# Patient Record
Sex: MALE | Race: WHITE | Employment: FULL TIME | URBAN - METROPOLITAN AREA
[De-identification: names, ages, dates, MRNs, and addresses within clinical notes are randomized per-mention and may not be internally consistent; named-entity substitution may affect disease eponyms.]

---

## 2018-08-23 ENCOUNTER — APPOINTMENT (OUTPATIENT)
Dept: GENERAL RADIOLOGY | Facility: CLINIC | Age: 28
End: 2018-08-23
Attending: PHYSICIAN ASSISTANT
Payer: COMMERCIAL

## 2018-08-23 ENCOUNTER — HOSPITAL ENCOUNTER (EMERGENCY)
Facility: CLINIC | Age: 28
Discharge: HOME | End: 2018-08-23
Attending: PHYSICIAN ASSISTANT | Admitting: PHYSICIAN ASSISTANT
Payer: COMMERCIAL

## 2018-08-23 VITALS
OXYGEN SATURATION: 97 % | DIASTOLIC BLOOD PRESSURE: 83 MMHG | TEMPERATURE: 98.1 F | RESPIRATION RATE: 16 BRPM | SYSTOLIC BLOOD PRESSURE: 127 MMHG

## 2018-08-23 DIAGNOSIS — S91.331A PUNCTURE WOUND OF RIGHT FOOT, INITIAL ENCOUNTER: ICD-10-CM

## 2018-08-23 PROCEDURE — G0463 HOSPITAL OUTPT CLINIC VISIT: HCPCS

## 2018-08-23 PROCEDURE — 99213 OFFICE O/P EST LOW 20 MIN: CPT | Performed by: PHYSICIAN ASSISTANT

## 2018-08-23 PROCEDURE — 73630 X-RAY EXAM OF FOOT: CPT | Mod: RT

## 2018-08-23 RX ORDER — CIPROFLOXACIN 500 MG/1
500 TABLET, FILM COATED ORAL 2 TIMES DAILY
Qty: 10 TABLET | Refills: 0 | Status: SHIPPED | OUTPATIENT
Start: 2018-08-23 | End: 2018-08-28

## 2018-08-23 NOTE — ED AVS SNAPSHOT
[facility]    [address] [facility] [address]    Phone:  [phone]    Fax:  [phone]                                       Sravan Mcintosh   MRN: [id number]    Department:  [facility]   Date of Visit:  8/23/2018           After Visit Summary Signature Page     I have received my discharge instructions, and my questions have been answered. I have discussed any challenges I see with this plan with the nurse or doctor.    ..........................................................................................................................................  Patient/Patient Representative Signature      ..........................................................................................................................................  Patient Representative Print Name and Relationship to Patient    ..................................................               ................................................  Date                                            Time    ..........................................................................................................................................  Reviewed by Signature/Title    ...................................................              ..............................................  Date                                                            Time          22EPIC Rev 08/18

## 2018-08-23 NOTE — ED AVS SNAPSHOT
[facility]    [address] [facility] [address]    Phone:  [phone]    Fax:  [phone]                                       Sravan Mcintosh   MRN: [id number]    Department:  [facility]   Date of Visit:  8/23/2018           Patient Information     Date Of Birth          1990        Your diagnoses for this visit were:     Puncture wound of right foot, initial encounter        You were seen by Cherise Tejada PA-C.      Follow-up Information     Please follow up.    Why:  As needed, If symptoms worsen        Discharge Instructions         Puncture Wound: Foot       A puncture wound occurs when a pointed object (such as a nail) pushes into the skin. It may go into the tissues below the skin of the foot, including fat and muscle. This type of wound is narrow and deep. They can be hard to clean. Puncture wounds are at high risk for becoming infected. One type of serious infection is more likely if you were wearing a rubber-soled shoe at the time of injury. Bacteria from the sole of the shoe may be dragged into the wound. Symptoms of infection may appear as late as 2 to 3 weeks after the injury. Be sure to watch for symptoms of infection and call your healthcare provider right away if any them appear.  X-rays may be done to see whether any objects remain under the skin. Your may also need a tetanus shot. This is given if you are not up to date on this vaccination and the object that caused the wound may lead to tetanus.  Puncture wounds can easily become infected.   Home care    When you sit or lie down, raise the foot above the level of your heart. This helps reduce swelling and pain.    Do not put weight on the injured foot if it hurts to do so or if you were told to keep weight off the injury.    Your healthcare provider may prescribe an antibiotic. This is to help prevent infection. Follow all instructions for taking this medicine. Take the  medicine every day until it is gone or you are told to stop. You should not have any left over.    The healthcare provider may prescribe medicines for pain. Follow instructions for taking them.    You can take acetaminophen or ibuprofen for pain, unless you were given a different pain medicine to use.     Follow the healthcare provider s instructions on how to care for the wound.    Keep the wound clean and dry. Do not get the wound wet until you are told it is okay to do so. If the area gets wet, gently pat it dry with a clean cloth. Replace the wet bandage with a dry one.    If a bandage was applied and it becomes wet or dirty, replace it. Otherwise, leave it in place for the first 24 hours.    Once you can get the wound wet, you may shower as usual but do not soak the wound in water (no tub baths or swimming)    Check the wound daily for symptoms of infection. These include:  ? Increasing redness or swelling around the wound  ? Increased warmth of the wound  ? Worsening pain  ? Red streaking lines away from the wound  ? Draining pus  Follow-up care  Follow up with your healthcare provider as advised.   When to seek medical advice  Call your healthcare provider right away if any of these occur:    Any symptoms of infection (listed above)    Fever of 100.4 F (38. C) or higher, or as directed by your healthcare provider    Wound changes colors    Numbness around the wound    Decreased movement around the injured area  Date Last Reviewed: 8/24/2015 2000-2017 [employer]. [address] [place] [address]. All rights reserved. This information is not intended as a substitute for professional medical care. Always follow your healthcare professional's instructions.          24 Hour Appointment Hotline       To make an appointment at any [facility], call [phone] ([phone]). If you don't have a family doctor or clinic, we will help you find one. [facility] are conveniently  located to serve the needs of you and your family.             Review of your medicines      START taking        Dose / Directions Last dose taken    ciprofloxacin 500 MG tablet   Commonly known as:  CIPRO   Dose:  500 mg   Quantity:  10 tablet        Take 1 tablet (500 mg) by mouth 2 times daily for 5 days   Refills:  0          Our records show that you are taking the medicines listed below. If these are incorrect, please call your family doctor or clinic.        Dose / Directions Last dose taken    ibuprofen 800 MG tablet   Commonly known as:  ADVIL/MOTRIN   Dose:  800 mg   Quantity:  90 tablet        Take 1 tablet by mouth every 8 hours as needed for pain.   Refills:  1        omeprazole 20 MG tablet   Dose:  20 mg   Quantity:  30 tablet        Take 1 tablet by mouth daily. Take 30-60 minutes before a meal.   Refills:  0                Prescriptions were sent or printed at these locations (1 Prescription)                   [facility] [place], [place] - [address]   [address] [facility] [facility] [address]    Telephone:  [phone]   Fax:  [phone]   Hours:                  E-Prescribed (1 of 1)         ciprofloxacin (CIPRO) 500 MG tablet                Procedures and tests performed during your visit     Foot  XR, G/E 3 views, right      Orders Needing Specimen Collection     None      Pending Results     No orders found from 8/21/2018 to 8/24/2018.            Pending Culture Results     No orders found from 8/21/2018 to 8/24/2018.            Pending Results Instructions     If you had any lab results that were not finalized at the time of your Discharge, you can call the ED Lab Result RN at [phone]. You will be contacted by this team for any positive Lab results or changes in treatment. The nurses are available 7 days a week from 10A to 6:30P.  You can leave a message 24 hours per day and they will return your call.        Test Results From Your Hospital Stay        8/23/2018  2:58 PM  "     Narrative     XR FOOT RT G/E 3 VW  2018 2:38 PM    HISTORY:  Puncture wound from nail. Pain is overlying third distal  metatarsal.    COMPARISON:  2006.        Impression     IMPRESSION:  Negative. No evidence for fracture or radiopaque foreign  body.    KIARA MIRAMONTES MD                Thank you for choosing [facility]       Thank you for choosing [facility] for your care. Our goal is always to provide you with excellent care. Hearing back from our patients is one way we can continue to improve our services. Please take a few minutes to complete the written survey that you may receive in the mail after you visit with us. Thank you!        [employer] Information     [email or web address] lets you send messages to your doctor, view your test results, renew your prescriptions, schedule appointments and more. To sign up, go to [email or web address] . Click on \"Log in\" on the left side of the screen, which will take you to the Welcome page. Then click on \"Sign up Now\" on the right side of the page.     You will be asked to enter the access code listed below, as well as some personal information. Please follow the directions to create your username and password.     Your access code is: [email or web address]  Expires: 2018  2:59 PM     Your access code will  in 90 days. If you need help or a new code, please call your [facility] clinic or [phone].        Care EveryWhere ID     This is your Care EveryWhere ID. This could be used by other organizations to access your [facility] medical records  [id number]        Equal Access to Services     [facility] CATALINA : Hadii aad ku hadasho Soomaali, waaxda luqadaha, qaybta kaalmada adeegyada, galileo ramos . So [facility] [phone].    ATENCIÓN: Si habla español, tiene a pittman disposición servicios gratuitos de asistencia lingüística. Llame al [phone].    We comply with applicable federal civil rights laws and [place] laws. We do not discriminate on the " basis of race, color, national origin, age, disability, sex, sexual orientation, or gender identity.            After Visit Summary       This is your record. Keep this with you and show to your community pharmacist(s) and doctor(s) at your next visit.

## 2018-08-23 NOTE — DISCHARGE INSTRUCTIONS
Puncture Wound: Foot       A puncture wound occurs when a pointed object (such as a nail) pushes into the skin. It may go into the tissues below the skin of the foot, including fat and muscle. This type of wound is narrow and deep. They can be hard to clean. Puncture wounds are at high risk for becoming infected. One type of serious infection is more likely if you were wearing a rubber-soled shoe at the time of injury. Bacteria from the sole of the shoe may be dragged into the wound. Symptoms of infection may appear as late as 2 to 3 weeks after the injury. Be sure to watch for symptoms of infection and call your healthcare provider right away if any them appear.  X-rays may be done to see whether any objects remain under the skin. Your may also need a tetanus shot. This is given if you are not up to date on this vaccination and the object that caused the wound may lead to tetanus.  Puncture wounds can easily become infected.   Home care    When you sit or lie down, raise the foot above the level of your heart. This helps reduce swelling and pain.    Do not put weight on the injured foot if it hurts to do so or if you were told to keep weight off the injury.    Your healthcare provider may prescribe an antibiotic. This is to help prevent infection. Follow all instructions for taking this medicine. Take the medicine every day until it is gone or you are told to stop. You should not have any left over.    The healthcare provider may prescribe medicines for pain. Follow instructions for taking them.    You can take acetaminophen or ibuprofen for pain, unless you were given a different pain medicine to use.     Follow the healthcare provider s instructions on how to care for the wound.    Keep the wound clean and dry. Do not get the wound wet until you are told it is okay to do so. If the area gets wet, gently pat it dry with a clean cloth. Replace the wet bandage with a dry one.    If a bandage was applied and it  becomes wet or dirty, replace it. Otherwise, leave it in place for the first 24 hours.    Once you can get the wound wet, you may shower as usual but do not soak the wound in water (no tub baths or swimming)    Check the wound daily for symptoms of infection. These include:  ? Increasing redness or swelling around the wound  ? Increased warmth of the wound  ? Worsening pain  ? Red streaking lines away from the wound  ? Draining pus  Follow-up care  Follow up with your healthcare provider as advised.   When to seek medical advice  Call your healthcare provider right away if any of these occur:    Any symptoms of infection (listed above)    Fever of 100.4 F (38. C) or higher, or as directed by your healthcare provider    Wound changes colors    Numbness around the wound    Decreased movement around the injured area  Date Last Reviewed: 8/24/2015 2000-2017 [employer]. [address] [place] [address]. All rights reserved. This information is not intended as a substitute for professional medical care. Always follow your healthcare professional's instructions.

## 2018-08-23 NOTE — ED TRIAGE NOTES
Patient here for pain in the R foot - stepped on nail.  Last tetanus - 2015.  Patient presents ambulatory to the urgent care.

## 2018-08-23 NOTE — ED PROVIDER NOTES
History     Chief Complaint   Patient presents with     Foot Pain     Had tram nail into bottom of R foot about 2 hours ago.  Took Ibuprofen.       HPI  Sravan Mcintosh is a 27 year old male who sent to the urgent care with concern of her right foot injury which occurred approximately 4 hours ago.  Patient will stepped on a nail which went through his rubber soled shoe and punctured the plantar surface of his right foot.  Since then he can have some mild to moderate pain.  He cleaned with soap and water , warm soak, and applied band aid pressure dressing.  He is unaware of any swelling in the foot.  No known worsening erythema.  He has not had any discharge or drainage from the wound.  He has not had any numbness or paresthesias.  His tetanus vaccine is up to date per his report.      Problem List:    Patient Active Problem List    Diagnosis Date Noted     GERD (gastroesophageal reflux disease) 03/18/2013     Priority: Medium     CARDIOVASCULAR SCREENING; LDL GOAL LESS THAN 160 10/31/2010     Priority: Medium     PCL TEAR, INCOMPLETE 01/30/2007     Priority: Medium     January 30, 2007 - patellar subluxation vs post/ant cruciate injury.  February 7, 2007 - ortho.  2/19/07 [facility]. MRI-PCL tear of left knee. Start PT. If not able to pass PT in 3 months, further eval on surgery and collect stress x-rays at that time.       Backache 01/15/2007     Priority: Medium     January 15, 2007 - straightening of lordosis otherwise xray negative.  Offered PT but decline and opted for watchful waiting.  Problem list name updated by automated process. Provider to review       Lyme disease 06/29/2006     Priority: Medium     June 29, 2006 - EM present.  Doxy 100 BID x 28 days.  Ibuprofen and recheck if worse/severe.        Past Medical History:    Past Medical History:   Diagnosis Date     Other acne      Past Surgical History:    Past Surgical History:   Procedure Laterality Date     NO HISTORY OF SURGERY       Family  History:    Family History   Problem Relation Age of Onset     Diabetes Maternal Grandfather      Hypertension No family hx of      Cerebrovascular Disease No family hx of      Breast Cancer No family hx of      Cancer - colorectal No family hx of      Prostate Cancer No family hx of      C.A.D. Maternal Grandfather      Blood Disease Father      lymphoma     Social History:  Marital Status:  Single [1]  Social History   Substance Use Topics     Smoking status: Never Smoker     Smokeless tobacco: Never Used     Alcohol use No      Medications:      ciprofloxacin (CIPRO) 500 MG tablet   ibuprofen (ADVIL,MOTRIN) 800 MG tablet   omeprazole 20 MG tablet     Review of Systems  INTEGUMENTARY/SKIN: POSITIVE for puncture wound right foot NEGATIVE for laceration, erythema or worrisome rashes   MUSCULOSKELETAL: POSITIVE  for right foot pain and NEGATIVE for other concerning new onset arthralgias or myalgias   NEURO: NEGATIVE for numbness paresthesias   Physical Exam   BP: 127/83  Heart Rate: 91  Temp: 98.1  F (36.7  C)  Resp: 16  SpO2: 97 %  Physical Exam   Constitutional: He is oriented to person, place, and time. He appears well-developed and well-nourished. No distress.   Musculoskeletal:        Right ankle: Normal.        Right foot: There is tenderness. There is normal range of motion, no bony tenderness, no swelling, no crepitus and no deformity.        Feet:    Neurological: He is alert and oriented to person, place, and time. No sensory deficit.   Skin: Skin is warm and dry. No ecchymosis and no rash noted. No erythema.     ED Course     ED Course     Procedures        Critical Care time:  none        Results for orders placed or performed during the hospital encounter of 08/23/18   Foot  XR, G/E 3 views, right    Narrative    XR FOOT RT G/E 3 VW  8/23/2018 2:38 PM    HISTORY:  Puncture wound from nail. Pain is overlying third distal  metatarsal.    COMPARISON:  5/2/2006.      Impression    IMPRESSION:  Negative. No  evidence for fracture or radiopaque foreign  body.    KIARA MIRAMONTES MD     Medications - No data to display    Assessments & Plan (with Medical Decision Making)     I have reviewed the nursing notes.  I have reviewed the findings, diagnosis, plan and need for follow up with the patient.       New Prescriptions    CIPROFLOXACIN (CIPRO) 500 MG TABLET    Take 1 tablet (500 mg) by mouth 2 times daily for 5 days     Final diagnoses:   Puncture wound of right foot, initial encounter     27-year-old male presents to urgent care with concern of a puncture wound to his right foot which occurred earlier this afternoon.  He had stable vital signs upon arrival.  Physical exam findings as described above.  As part of evaluation he did have x-ray of his right foot which is negative for acute fracture, retained foreign body, air pocket.  Given increased risk of infection through a rubber soled shoe patient was initiated on Cipro 500 mg twice daily for 5 days.  He was instructed to use symptomatic treatment with warm compresses.  Follow-up as needed if new or worsening symptoms develop.  Worrisome reasons to return to ER/UC discussed.      Disclaimer: This note consists of symbols derived from keyboarding, dictation, and/or voice recognition software. As a result, there may be errors in the script that have gone undetected.  Please consider this when interpreting information found in the chart.    8/23/2018   [facility] EMERGENCY DEPARTMENT     Cherise Tejada PA-C  08/24/18 4416

## 2018-11-25 ENCOUNTER — HOSPITAL ENCOUNTER (EMERGENCY)
Facility: CLINIC | Age: 28
Discharge: HOME | End: 2018-11-25
Attending: EMERGENCY MEDICINE | Admitting: EMERGENCY MEDICINE
Payer: OTHER MISCELLANEOUS

## 2018-11-25 VITALS
HEART RATE: 100 BPM | DIASTOLIC BLOOD PRESSURE: 93 MMHG | SYSTOLIC BLOOD PRESSURE: 143 MMHG | TEMPERATURE: 98.5 F | RESPIRATION RATE: 18 BRPM

## 2018-11-25 DIAGNOSIS — S00.83XA CONTUSION OF FACE, INITIAL ENCOUNTER: ICD-10-CM

## 2018-11-25 PROCEDURE — 99282 EMERGENCY DEPT VISIT SF MDM: CPT | Mod: Z6 | Performed by: EMERGENCY MEDICINE

## 2018-11-25 PROCEDURE — 99282 EMERGENCY DEPT VISIT SF MDM: CPT | Performed by: EMERGENCY MEDICINE

## 2018-11-25 ASSESSMENT — VISUAL ACUITY
OD: 20/15
OS: 20/15

## 2018-11-25 NOTE — DISCHARGE INSTRUCTIONS
"  Facial Contusion  A contusion is another word for a bruise. It happens when small blood vessels break open and leak blood into the nearby area. A facial contusion can result from a bump, hit, or fall. This may happen during sports or an accident. Symptoms of a contusion often include changes in skin color (bruising), swelling, and pain.   The swelling from the contusion should decrease in a few days. Bruising and pain may take several weeks to go away.   Home care    If you have been prescribed medicines for pain, take them as directed.    To help reduce swelling and pain, wrap a cold pack or bag of frozen peas in a thin towel. Put it on the injured area for up to 20 minutes. Do this a few times a day until the swelling goes down.     If you have scrapes or cuts on your face requiring stiches or other closures, care for them as directed.    For the next 24 hours (or longer if instructed):  ? Don t drink alcohol, or use sedatives or medicines that make you sleepy.  ? Don t drive or operate machinery.  ? Don't do anything strenuous. Don t lift or strain.  ? Don't return to sports or other activity that could result in another head injury.  Note about concussions  Because the injury was to your head, it is possible that a concussion (mild brain injury) could result. Symptoms of a concussion can show up later. Be alert for signs and symptoms of a concussion. Seek emergency medical care if any of these develop over the next hours to days:    Headache    Nausea or vomiting    Dizziness    Sensitivity to light or noise    Unusual sleepiness or grogginess    Trouble falling asleep    Personality changes    Vision changes    Memory loss    Confusion    Trouble walking or clumsiness    Loss of consciousness (even for a short time)    Inability to be awakened    Feeling \"off\" or slow as if in a daze   Follow-up care  Follow up with your healthcare provider, or as directed.  When to seek medical advice  Call your healthcare " provider right away if any of these occur:    Swelling or pain that gets worse, not better    New swelling or pain    Warmth or drainage from the swollen area or from cuts or scrapes    Fluid drainage or bleeding from the nose or ears    Fever of 100.4 F (38 C) or higher, or as directed by your healthcare provider  Call 911  Call 911 if any of the following occur:     Repeated vomiting    Unusual drowsiness or trouble awakening    Fainting or loss of consciousness    Seizure    Worsening confusion, memory loss, dizziness, headache, behavior, speech, or vision  Date Last Reviewed: 5/1/2017 2000-2018 [employer]. [address] [place] [address]. All rights reserved. This information is not intended as a substitute for professional medical care. Always follow your healthcare professional's instructions.
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
## 2018-11-25 NOTE — ED AVS SNAPSHOT
[facility] Emergency Department    [address] [address]    Phone:  [phone]    Fax:  [phone]                                       Sravan Mcintosh   MRN: [id number]    Department:  [facility] Emergency Department   Date of Visit:  11/25/2018           Patient Information     Date Of Birth          1990        Your diagnoses for this visit were:     Contusion of face, initial encounter        You were seen by Rosalino Avila MD.      Follow-up Information     Schedule an appointment as soon as possible for a visit with [facility].    Why:  As needed    Contact information:    [address] [address] [phone]          Discharge Instructions         Facial Contusion  A contusion is another word for a bruise. It happens when small blood vessels break open and leak blood into the nearby area. A facial contusion can result from a bump, hit, or fall. This may happen during sports or an accident. Symptoms of a contusion often include changes in skin color (bruising), swelling, and pain.   The swelling from the contusion should decrease in a few days. Bruising and pain may take several weeks to go away.   Home care    If you have been prescribed medicines for pain, take them as directed.    To help reduce swelling and pain, wrap a cold pack or bag of frozen peas in a thin towel. Put it on the injured area for up to 20 minutes. Do this a few times a day until the swelling goes down.     If you have scrapes or cuts on your face requiring stiches or other closures, care for them as directed.    For the next 24 hours (or longer if instructed):  ? Don t drink alcohol, or use sedatives or medicines that make you sleepy.  ? Don t drive or operate machinery.  ? Don't do anything strenuous. Don t lift or strain.  ? Don't return to sports or other activity that could result in another head injury.  Note about concussions  Because the injury was  "to your head, it is possible that a concussion (mild brain injury) could result. Symptoms of a concussion can show up later. Be alert for signs and symptoms of a concussion. Seek emergency medical care if any of these develop over the next hours to days:    Headache    Nausea or vomiting    Dizziness    Sensitivity to light or noise    Unusual sleepiness or grogginess    Trouble falling asleep    Personality changes    Vision changes    Memory loss    Confusion    Trouble walking or clumsiness    Loss of consciousness (even for a short time)    Inability to be awakened    Feeling \"off\" or slow as if in a daze   Follow-up care  Follow up with your healthcare provider, or as directed.  When to seek medical advice  Call your healthcare provider right away if any of these occur:    Swelling or pain that gets worse, not better    New swelling or pain    Warmth or drainage from the swollen area or from cuts or scrapes    Fluid drainage or bleeding from the nose or ears    Fever of 100.4 F (38 C) or higher, or as directed by your healthcare provider  Call 911  Call 911 if any of the following occur:     Repeated vomiting    Unusual drowsiness or trouble awakening    Fainting or loss of consciousness    Seizure    Worsening confusion, memory loss, dizziness, headache, behavior, speech, or vision  Date Last Reviewed: 5/1/2017 2000-2018 [employer]. [address] [place] [address], [place] [address]. All rights reserved. This information is not intended as a substitute for professional medical care. Always follow your healthcare professional's instructions.          24 Hour Appointment Hotline       To make an appointment at any [facility], call [phone] ([phone]). If you don't have a family doctor or clinic, we will help you find one. [facility] clinics are conveniently located to serve the needs of you and your family.             Review of your medicines      Our records show that you are taking the " "medicines listed below. If these are incorrect, please call your family doctor or clinic.        Dose / Directions Last dose taken    ibuprofen 800 MG tablet   Commonly known as:  ADVIL/MOTRIN   Dose:  800 mg   Quantity:  90 tablet        Take 1 tablet by mouth every 8 hours as needed for pain.   Refills:  1        omeprazole 20 MG tablet   Dose:  20 mg   Quantity:  30 tablet        Take 1 tablet by mouth daily. Take 30-60 minutes before a meal.   Refills:  0                Orders Needing Specimen Collection     None      Pending Results     No orders found from 11/23/2018 to 11/26/2018.            Pending Culture Results     No orders found from 11/23/2018 to 11/26/2018.            Pending Results Instructions     If you had any lab results that were not finalized at the time of your Discharge, you can call the ED Lab Result RN at [phone]. You will be contacted by this team for any positive Lab results or changes in treatment. The nurses are available 7 days a week from 10A to 6:30P.  You can leave a message 24 hours per day and they will return your call.        Test Results From Your Hospital Stay               Thank you for choosing [facility]       Thank you for choosing [facility] for your care. Our goal is always to provide you with excellent care. Hearing back from our patients is one way we can continue to improve our services. Please take a few minutes to complete the written survey that you may receive in the mail after you visit with us. Thank you!        [employer] Information     [employer] lets you send messages to your doctor, view your test results, renew your prescriptions, schedule appointments and more. To sign up, go to [email or web address] . Click on \"Log in\" on the left side of the screen, which will take you to the Welcome page. Then click on \"Sign up Now\" on the right side of the page.     You will be asked to enter the access code listed below, as well as some personal information. Please " follow the directions to create your username and password.     Your access code is: [email or web address]  Expires: 2019  1:43 AM     Your access code will  in 90 days. If you need help or a new code, please call your [facility] clinic or [phone].        Care EveryWhere ID     This is your Care EveryWhere ID. This could be used by other organizations to access your [facility] medical records  [id number]        Equal Access to Services     SANTO ARNOLD : Hadii lino kulkarni hadasho Soomaali, waaxda luqadaha, qaybta kaalmada adeegyada, galileo ramos . So [facility] [phone].    ATENCIÓN: Si habla español, tiene a pittman disposición servicios gratuitos de asistencia lingüística. Llame al [phone].    We comply with applicable federal civil rights laws and [place] laws. We do not discriminate on the basis of race, color, national origin, age, disability, sex, sexual orientation, or gender identity.            After Visit Summary       This is your record. Keep this with you and show to your community pharmacist(s) and doctor(s) at your next visit.

## 2018-11-25 NOTE — ED AVS SNAPSHOT
[facility] Emergency Department    [address] [address]    Phone:  [phone]    Fax:  [phone]                                       Sravan Mcintosh   MRN: [id number]    Department:  [facility] Emergency Department   Date of Visit:  11/25/2018           After Visit Summary Signature Page     I have received my discharge instructions, and my questions have been answered. I have discussed any challenges I see with this plan with the nurse or doctor.    ..........................................................................................................................................  Patient/Patient Representative Signature      ..........................................................................................................................................  Patient Representative Print Name and Relationship to Patient    ..................................................               ................................................  Date                                   Time    ..........................................................................................................................................  Reviewed by Signature/Title    ...................................................              ..............................................  Date                                               Time          22EPIC Rev 08/18

## 2018-11-29 ASSESSMENT — ENCOUNTER SYMPTOMS
CHEST TIGHTNESS: 0
NECK PAIN: 0
CONFUSION: 0
EYE PAIN: 0
BACK PAIN: 0
PHOTOPHOBIA: 0
EYE DISCHARGE: 0
WOUND: 1
NAUSEA: 0
WEAKNESS: 0
HEADACHES: 0
SHORTNESS OF BREATH: 0
EYE REDNESS: 0
FATIGUE: 0
LIGHT-HEADEDNESS: 0
NUMBNESS: 0

## 2020-09-21 ENCOUNTER — HOSPITAL ENCOUNTER (EMERGENCY)
Facility: CLINIC | Age: 30
Discharge: HOME | End: 2020-09-21
Attending: NURSE PRACTITIONER | Admitting: NURSE PRACTITIONER
Payer: OTHER MISCELLANEOUS

## 2020-09-21 VITALS
TEMPERATURE: 100.2 F | SYSTOLIC BLOOD PRESSURE: 154 MMHG | HEIGHT: 69 IN | WEIGHT: 209 LBS | BODY MASS INDEX: 30.96 KG/M2 | DIASTOLIC BLOOD PRESSURE: 103 MMHG

## 2020-09-21 DIAGNOSIS — Z77.21 EXPOSURE TO BLOOD OR BODY FLUID: ICD-10-CM

## 2020-09-21 PROCEDURE — 99283 EMERGENCY DEPT VISIT LOW MDM: CPT | Performed by: NURSE PRACTITIONER

## 2020-09-21 PROCEDURE — 87522 HEPATITIS C REVRS TRNSCRPJ: CPT | Performed by: NURSE PRACTITIONER

## 2020-09-21 PROCEDURE — 99282 EMERGENCY DEPT VISIT SF MDM: CPT | Mod: Z6 | Performed by: NURSE PRACTITIONER

## 2020-09-21 PROCEDURE — C9803 HOPD COVID-19 SPEC COLLECT: HCPCS | Performed by: NURSE PRACTITIONER

## 2020-09-21 PROCEDURE — 87389 HIV-1 AG W/HIV-1&-2 AB AG IA: CPT | Performed by: NURSE PRACTITIONER

## 2020-09-21 PROCEDURE — 86704 HEP B CORE ANTIBODY TOTAL: CPT | Performed by: NURSE PRACTITIONER

## 2020-09-21 PROCEDURE — 87340 HEPATITIS B SURFACE AG IA: CPT | Performed by: NURSE PRACTITIONER

## 2020-09-21 PROCEDURE — 86803 HEPATITIS C AB TEST: CPT | Performed by: NURSE PRACTITIONER

## 2020-09-21 PROCEDURE — 86706 HEP B SURFACE ANTIBODY: CPT | Performed by: NURSE PRACTITIONER

## 2020-09-21 PROCEDURE — U0003 INFECTIOUS AGENT DETECTION BY NUCLEIC ACID (DNA OR RNA); SEVERE ACUTE RESPIRATORY SYNDROME CORONAVIRUS 2 (SARS-COV-2) (CORONAVIRUS DISEASE [COVID-19]), AMPLIFIED PROBE TECHNIQUE, MAKING USE OF HIGH THROUGHPUT TECHNOLOGIES AS DESCRIBED BY CMS-2020-01-R: HCPCS | Performed by: NURSE PRACTITIONER

## 2020-09-21 RX ORDER — CEPHALEXIN 500 MG/1
500 CAPSULE ORAL 2 TIMES DAILY
COMMUNITY

## 2020-09-21 ASSESSMENT — MIFFLIN-ST. JEOR: SCORE: 1893.4

## 2020-09-21 NOTE — ED AVS SNAPSHOT
[facility] Emergency Department  [address] [address]  Phone:  [phone]  Fax:  [phone]                                    Sravan Mcintosh   MRN: [id number]    Department:  [facility] Emergency Department   Date of Visit:  9/21/2020           After Visit Summary Signature Page    I have received my discharge instructions, and my questions have been answered. I have discussed any challenges I see with this plan with the nurse or doctor.    ..........................................................................................................................................  Patient/Patient Representative Signature      ..........................................................................................................................................  Patient Representative Print Name and Relationship to Patient    ..................................................               ................................................  Date                                   Time    ..........................................................................................................................................  Reviewed by Signature/Title    ...................................................              ..............................................  Date                                               Time          22EPIC Rev 08/18

## 2020-09-22 ENCOUNTER — TELEPHONE (OUTPATIENT)
Dept: EMERGENCY MEDICINE | Facility: CLINIC | Age: 30
End: 2020-09-22

## 2020-09-22 LAB
HBV CORE AB SERPL QL IA: NONREACTIVE
HBV SURFACE AB SERPL IA-ACNC: >1000 M[IU]/ML
HBV SURFACE AG SERPL QL IA: NONREACTIVE
HCV AB SERPL QL IA: NONREACTIVE
HIV 1+2 AB+HIV1 P24 AG SERPL QL IA: NONREACTIVE
SARS-COV-2 RNA SPEC QL NAA+PROBE: NOT DETECTED
SPECIMEN SOURCE: NORMAL

## 2020-09-22 NOTE — RESULT ENCOUNTER NOTE
The blood and body fluid lab result for Hepatitis B surface antibody from a recent exposure is POSITIVE (reactive).  Patient to be notified of result and advised per [place] ED lab result protocol     [If Hepatitis B surface ANTIBODY (keesha) is reactive/positive, this indicates Patient has immunity]

## 2020-09-22 NOTE — RESULT ENCOUNTER NOTE
The blood and body fluid lab result for Hepatitis C antibody (keesha) from a recent exposure is NEGATIVE (nonreactive).  Patient to be notified of result and advised per [place] ED lab result protocol
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
## 2020-09-22 NOTE — RESULT ENCOUNTER NOTE
The blood and body fluid lab result for Hepatitis B core antibody (keesha) from a recent exposure is negative (nonreactive).  Patient to be notified of result and advised per [place] ED lab result protocol

## 2020-09-22 NOTE — RESULT ENCOUNTER NOTE
The blood and body fluid lab result for Hepatitis B surface antigen (agn) from a recent exposure is negative (nonreactive).  Patient to be notified of result and advised per [place] ED lab result protocol
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
## 2020-09-22 NOTE — RESULT ENCOUNTER NOTE
The blood and body fluid lab result for HIV Antigen Antibody combo from a recent exposure was NEGATIVE (nonreactive).  Patient to be notified of result and advised per [place] ED lab result protocol

## 2020-09-23 NOTE — TELEPHONE ENCOUNTER
[employer] [facility] Emergency Department/Urgent Care Lab result notification     Patient/parent Name  Sravan    RN Assessment (Patient s current Symptoms), include time called.  [Insert Left message here if message left]  10:32AM: Patient returned call.   Lab result (if applicable):  The following blood and bodily fluid lab results from a recent exposure were all negative (nonreactive) for:     Hepatitis C antibody     Hepatitis B surface antigen (agn)    Hepatitis B Core antibody    HIV Antigen Antibody Combo.      The following blood and body fluid lab result from a recent exposure were Positive (reactive) for:     Hepatitis B surface antibody (keesha)  [Note: If vaccinated for Hep B (3 shot series) and result Negative, Patient is instructed to followup with PCP clinic within 72 hours].     Hepatitis C RNA quantitative (PENDING at this time)  Patient to be notified of result and advised per [facility] lab result protocol  [Note: If Hepatitis B surface ANTIBODY (keesha) is reactive/positive, this indicates Patient has immunity]  RN Recommendations/Instructions per [facility] lab result protocol  Patient notified of lab result and treatment recommendation.   Verified with the patient that he has been vaccinated with the hepatitis B vaccine series.   Reviewed follow up recommendation of 6 weeks to have labs retested.   Advised to follow up with his PCP as directed by the ED provider.   The patient is wondering about the source lab results, he was referred to his PCP to discuss any source results.  The patient is comfortable with the information given and has no further questions.     Please Contact your PCP clinic or return to the Emergency department if your:    Symptoms return.    PCP follow-up Questions asked: YES       [RN Name]  Gerda Juarez RN  [facility] RN  Lung Nodule and ED Lab Result RN  Epic pool (ED late result f/u RN): [id number]  [facility] INCIDENTAL RADIOLOGY F/U NURSES: [id number]  Ph#  [phone]

## 2020-09-24 LAB
HCV RNA SERPL NAA+PROBE-ACNC: NORMAL [IU]/ML
HCV RNA SERPL NAA+PROBE-LOG IU: NORMAL LOG IU/ML

## 2020-09-24 NOTE — RESULT ENCOUNTER NOTE
The blood and body fluid lab result for Hepatitis C RNA quantitative from a recent exposure is negative (nonreactive).  Patient to be notified of result and advised per [facility] lab result protocol

## 2021-05-29 ENCOUNTER — HEALTH MAINTENANCE LETTER (OUTPATIENT)
Age: 31
End: 2021-05-29

## 2021-06-01 ENCOUNTER — RECORDS - HEALTHEAST (OUTPATIENT)
Dept: ADMINISTRATIVE | Facility: CLINIC | Age: 31
End: 2021-06-01

## 2021-09-18 ENCOUNTER — HEALTH MAINTENANCE LETTER (OUTPATIENT)
Age: 31
End: 2021-09-18

## 2022-06-19 ENCOUNTER — HEALTH MAINTENANCE LETTER (OUTPATIENT)
Age: 32
End: 2022-06-19

## 2022-11-19 ENCOUNTER — HEALTH MAINTENANCE LETTER (OUTPATIENT)
Age: 32
End: 2022-11-19

## 2023-07-02 ENCOUNTER — HEALTH MAINTENANCE LETTER (OUTPATIENT)
Age: 33
End: 2023-07-02